# Patient Record
Sex: FEMALE | Race: WHITE
[De-identification: names, ages, dates, MRNs, and addresses within clinical notes are randomized per-mention and may not be internally consistent; named-entity substitution may affect disease eponyms.]

---

## 2018-01-26 NOTE — PD
HPI


Chief Complaint:  Musculoskeletal Complaint


Time Seen by Provider:  10:18


Travel History


International Travel<30 days:  No


Contact w/Intl Traveler<30days:  No


Traveled to known affect area:  No





History of Present Illness


HPI


This is a 67-year-old female here with right foot pain after she tripped on a 

[place] yesterday evening.  Did not fall to the ground.  No head injury or loss of 

consciousness.  No paresthesia or weakness of the 70.  She has pain with 

weightbearing on the foot which is relieved with rest.  She is also complaining 

of mild URI-like symptoms 3 days.  The symptoms include nasal congestion, sore 

throat, cough.  Denies fever, chills, myalgias.





PFSH


Past Medical History


Anemia:  Yes


Anxiety:  Yes


Depression:  Yes


Gastrointestinal Disorders:  Yes (BARIATRIC SURGERY 1998, "TUMMY TUCK")


Hypertension:  Yes


Medical other:  Yes (TMJ)


Tetanus Vaccination:  > 5 Years


Influenza Vaccination:  No


Pregnant?:  Not Pregnant


Menopausal:  Yes





Past Surgical History


Abdominal Surgery:  Yes (gastric bypass and tummy tuck)


Cholecystectomy:  Yes


Tonsillectomy:  Yes





Social History


Alcohol Use:  Yes (rarely)


Tobacco Use:  No


Substance Use:  No





Allergies-Medications


(Allergen,Severity, Reaction):  


Coded Allergies:  


     No Known Allergies (Unverified  Adverse Reaction, Unknown, 1/26/18)


Reported Meds & Prescriptions





Reported Meds & Active Scripts


Active


Reported


Carbamazepine 100 Mg Chew 100 Mg CHEW TID


Khedezla 24 HR (Desvenlafaxine Succinate) 50 Mg Tab 50 Mg PO DAILY








Review of Systems


Except as stated in HPI:  all other systems reviewed are Neg


General / Constitutional:  No: Fever





Physical Exam


Narrative


GENERAL: Alert and nontoxic appearing 67-year-old female


SKIN: Warm and dry. No rash


HEAD: Normocephalic.  Atraumatic


EYES: No injection or drainage.  Pupils equal, round, reactive.


EARS/NOSE/THROAT: No TM erythema. Clear nasal discharge. Mild pharyngeal 

erythema without tonsilar hypertrophy or exudate.


NECK: Supple, trachea midline. No meningismus


CARDIOVASCULAR: Regular rate and rhythm 


RESPIRATORY: Breath sounds equal bilaterally. No accessory muscle use. No 

wheezing, rales, rhonchi.


GASTROINTESTINAL: Abdomen soft, non-tender, nondistended. 


MUSCULOSKELETAL: No cyanosis.  Right foot: Tenderness over the distal aspect of 

the fifth metatarsal.  No deformity.  Minimal swelling.  Patient can flex and 

extend all toes.  2+ dorsal pedis pulse.  Brisk cap refill.





Data


Data


Last Documented VS





Vital Signs








  Date Time  Temp Pulse Resp B/P (MAP) Pulse Ox O2 Delivery O2 Flow Rate FiO2


 


1/26/18 10:14 98.7 67 16 210/87 (128) 97   








Orders





 Orders


Foot, Complete (Arv2sux) (1/26/18 )


Splint Or Brace Apply/Monitor (1/26/18 11:09)








MDM


Medical Decision Making


Medical Screen Exam Complete:  Yes


Emergency Medical Condition:  Yes


Differential Diagnosis


Metatarsal fracture, toe fracture, sprain, contusion, URI


Narrative Course


This is a 67-year-old female here with right foot pain after she twisted the 

foot yesterday evening.  The extremity is neurovascularly intact.  No 

deformity.  She also has mild URI-like illness.  I do not suspect influenza.  X-

ray show a proximal fifth phalanx fracture.  Patient was put in a postop shoe.  

Crutches were offered.  Pain medication.  Rest, ice, elevate the extremity.  

Instructed to follow-up with her podiatrist Dr. Brito.





Diagnosis





 Primary Impression:  


 Fracture of proximal phalanx of lesser toe of right foot


 Qualified Codes:  S92.514A - Nondisplaced fracture of proximal phalanx of 

right lesser toe(s), initial encounter for closed fracture


 Additional Impression:  


 URI (upper respiratory infection)


 Qualified Codes:  J06.9 - Acute upper respiratory infection, unspecified


Referrals:  


Vance Brito DPM





***Additional Instructions:  


Rest, ice, elevate the extremity.


Postop shoe as directed.


Crutches or cane for weightbearing as needed.


Make a follow-up appointment with Dr. Brito.


Scripts


Acetaminophen-Codeine (Tylenol-Codeine #3) 300-30 mg Tab


1-2 TAB PO Q6H Y for PAIN, #12 TAB 0 Refills


   Prov: Carmen Nair         1/26/18











Carmen Nair Jan 26, 2018 11:18

## 2018-01-26 NOTE — RADRPT
EXAM DATE/TIME:  01/26/2018 10:48 

 

CORRECTION

Corrected on: January 26, 2018; 

 

 

 

[facility] COMPARISON:     

No previous studies available for comparison.

 

                     

INDICATIONS :     

Tripped and fell, right lateral foot pain

                     

 

MEDICAL HISTORY :     

None.          

 

SURGICAL HISTORY :     

None.   

 

ENCOUNTER:     

Initial                                        

 

ACUITY:     

3 days      

 

PAIN SCORE:     

7/10

 

LOCATION:     

Right lateral foot

 

FINDINGS:     

There is 8 minimally displaced fracture proximal phalanx fifth toe.  No other fractures appreciated.

CONCLUSION:     Fracture proximal fifth toe.  

 

 

 Petr Hyman MD FACR on January 26, 2018 at 11:13           

Board Certified Radiologist.

 This report was verified electronically.  Petr Hyman MD FACR on January 26, 2018 at 11:30     
      

Board Certified Radiologist.

 This report was verified electronically.

## 2018-02-25 ENCOUNTER — HOSPITAL ENCOUNTER (EMERGENCY)
Dept: HOSPITAL 17 - PHED | Age: 68
Discharge: HOME | End: 2018-02-25
Payer: COMMERCIAL

## 2018-02-25 VITALS
SYSTOLIC BLOOD PRESSURE: 115 MMHG | OXYGEN SATURATION: 95 % | RESPIRATION RATE: 16 BRPM | HEART RATE: 80 BPM | DIASTOLIC BLOOD PRESSURE: 73 MMHG

## 2018-02-25 VITALS
TEMPERATURE: 97.9 F | SYSTOLIC BLOOD PRESSURE: 179 MMHG | WEIGHT: 277.78 LBS | BODY MASS INDEX: 47.42 KG/M2 | OXYGEN SATURATION: 97 % | RESPIRATION RATE: 24 BRPM | HEIGHT: 64 IN | DIASTOLIC BLOOD PRESSURE: 76 MMHG | HEART RATE: 129 BPM

## 2018-02-25 VITALS
SYSTOLIC BLOOD PRESSURE: 134 MMHG | RESPIRATION RATE: 16 BRPM | DIASTOLIC BLOOD PRESSURE: 81 MMHG | HEART RATE: 71 BPM | OXYGEN SATURATION: 95 %

## 2018-02-25 VITALS
SYSTOLIC BLOOD PRESSURE: 149 MMHG | OXYGEN SATURATION: 96 % | RESPIRATION RATE: 18 BRPM | DIASTOLIC BLOOD PRESSURE: 78 MMHG | HEART RATE: 88 BPM

## 2018-02-25 VITALS
DIASTOLIC BLOOD PRESSURE: 73 MMHG | OXYGEN SATURATION: 95 % | SYSTOLIC BLOOD PRESSURE: 115 MMHG | RESPIRATION RATE: 18 BRPM | TEMPERATURE: 97.9 F | HEART RATE: 91 BPM

## 2018-02-25 DIAGNOSIS — F32.9: ICD-10-CM

## 2018-02-25 DIAGNOSIS — I51.7: ICD-10-CM

## 2018-02-25 DIAGNOSIS — I48.91: Primary | ICD-10-CM

## 2018-02-25 DIAGNOSIS — D64.9: ICD-10-CM

## 2018-02-25 DIAGNOSIS — I10: ICD-10-CM

## 2018-02-25 DIAGNOSIS — R94.31: ICD-10-CM

## 2018-02-25 DIAGNOSIS — R06.02: ICD-10-CM

## 2018-02-25 DIAGNOSIS — Z79.899: ICD-10-CM

## 2018-02-25 DIAGNOSIS — F41.9: ICD-10-CM

## 2018-02-25 LAB
BASOPHILS # BLD AUTO: 0.1 TH/MM3 (ref 0–0.2)
BASOPHILS NFR BLD: 0.9 % (ref 0–2)
BUN SERPL-MCNC: 12 MG/DL (ref 7–18)
CALCIUM SERPL-MCNC: 8.5 MG/DL (ref 8.5–10.1)
CHLORIDE SERPL-SCNC: 106 MEQ/L (ref 98–107)
CREAT SERPL-MCNC: 0.68 MG/DL (ref 0.5–1)
EOSINOPHIL # BLD: 0.1 TH/MM3 (ref 0–0.4)
EOSINOPHIL NFR BLD: 1.3 % (ref 0–4)
ERYTHROCYTE [DISTWIDTH] IN BLOOD BY AUTOMATED COUNT: 18.8 % (ref 11.6–17.2)
GFR SERPLBLD BASED ON 1.73 SQ M-ARVRAT: 87 ML/MIN (ref 89–?)
GLUCOSE SERPL-MCNC: 93 MG/DL (ref 74–106)
HCO3 BLD-SCNC: 28.5 MEQ/L (ref 21–32)
HCT VFR BLD CALC: 33.9 % (ref 35–46)
HGB BLD-MCNC: 11.1 GM/DL (ref 11.6–15.3)
INR PPP: 1.1 RATIO
LYMPHOCYTES # BLD AUTO: 1.1 TH/MM3 (ref 1–4.8)
LYMPHOCYTES NFR BLD AUTO: 18.9 % (ref 9–44)
MAGNESIUM SERPL-MCNC: 2.1 MG/DL (ref 1.5–2.5)
MCH RBC QN AUTO: 27.6 PG (ref 27–34)
MCHC RBC AUTO-ENTMCNC: 32.8 % (ref 32–36)
MCV RBC AUTO: 84.3 FL (ref 80–100)
MONOCYTE #: 0.6 TH/MM3 (ref 0–0.9)
MONOCYTES NFR BLD: 9.9 % (ref 0–8)
NEUTROPHILS # BLD AUTO: 3.7 TH/MM3 (ref 1.8–7.7)
NEUTROPHILS NFR BLD AUTO: 69 % (ref 16–70)
PLATELET # BLD: 250 TH/MM3 (ref 150–450)
PMV BLD AUTO: 8.7 FL (ref 7–11)
PROTHROMBIN TIME: 10.7 SEC (ref 9.8–11.6)
RBC # BLD AUTO: 4.03 MIL/MM3 (ref 4–5.3)
SODIUM SERPL-SCNC: 142 MEQ/L (ref 136–145)
TROPONIN I SERPL-MCNC: (no result) NG/ML (ref 0.02–0.05)
WBC # BLD AUTO: 5.6 TH/MM3 (ref 4–11)

## 2018-02-25 PROCEDURE — 93005 ELECTROCARDIOGRAM TRACING: CPT

## 2018-02-25 PROCEDURE — 80048 BASIC METABOLIC PNL TOTAL CA: CPT

## 2018-02-25 PROCEDURE — 84484 ASSAY OF TROPONIN QUANT: CPT

## 2018-02-25 PROCEDURE — 85610 PROTHROMBIN TIME: CPT

## 2018-02-25 PROCEDURE — 96374 THER/PROPH/DIAG INJ IV PUSH: CPT

## 2018-02-25 PROCEDURE — 85025 COMPLETE CBC W/AUTO DIFF WBC: CPT

## 2018-02-25 PROCEDURE — 85730 THROMBOPLASTIN TIME PARTIAL: CPT

## 2018-02-25 PROCEDURE — 71045 X-RAY EXAM CHEST 1 VIEW: CPT

## 2018-02-25 PROCEDURE — 83735 ASSAY OF MAGNESIUM: CPT

## 2018-02-25 PROCEDURE — 84443 ASSAY THYROID STIM HORMONE: CPT

## 2018-02-25 NOTE — PD
HPI


.


Shortness of breath


Chief Complaint:  Shortness of breath


Time Seen by Provider:  16:43


Travel History


International Travel<30 days:  No


Contact w/Intl Traveler<30days:  No





History of Present Illness


HPI


This patient presents with a 3 week history of shortness of breath.  She 

especially has dyspnea on exertion.  She states that she is not short of breath 

at rest.  She had no idea that her heart was beating too fast.  However, she 

states that she seen as an outpatient for an iron infusion 3 weeks ago and was 

found to be tachycardic.  She was advised to come to the emergency department 

at that time for evaluation but did not do so.  She attributed her symptoms to 

anxiety.  She states that she presented to us today simply because her symptoms 

have persisted.  There was no no acute change that brought her in today.





PFSH


Past Medical History


Anemia:  Yes


Anxiety:  Yes


Depression:  Yes


Gastrointestinal Disorders:  Yes (BARIATRIC SURGERY 1998, "TUMMY TUCK")


Hypertension:  Yes


Menopausal:  Yes





Past Surgical History


Abdominal Surgery:  Yes (gastric bypass and tummy tuck)


Cholecystectomy:  Yes


Tonsillectomy:  Yes





Social History


Alcohol Use:  Yes (rarely)


Tobacco Use:  No


Substance Use:  No





Allergies-Medications


(Allergen,Severity, Reaction):  


Coded Allergies:  


     No Known Allergies (Unverified  Adverse Reaction, Unknown, 2/25/18)


Reported Meds & Prescriptions





Reported Meds & Active Scripts


Active


Xarelto (Rivaroxaban) 20 Mg Tab 20 Mg PO DAILY


Cardizem CD 24 HR (Diltiazem CD 24 HR) 180 Mg Caper 180 Mg PO DAILY


Reported


Carbamazepine 100 Mg Chew 100 Mg CHEW TID


Xanax (Alprazolam) 0.5 Mg Tab 0.5 Mg PO DAILY PRN


Khedezla 24 HR (Desvenlafaxine Succinate) 50 Mg Tab 50 Mg PO DAILY








Review of Systems


Except as stated in HPI:  all other systems reviewed are Neg


Respiratory:  Positive: Shortness of Breath





Physical Exam


Narrative


GENERAL: Awake and alert.


SKIN:  warm/dry.  Normal color and turgor.


HEAD: Normocephalic.  Atraumatic.


EYES: Pupils equal and round. No scleral icterus. No injection or drainage. 


ENT: No nasal bleeding or discharge.  Mucous membranes pink and moist.


NECK: Trachea midline.  Full range of motion without pain.. 


CARDIOVASCULAR: Irregular rhythm with a rate variable between about 110 and 150.


RESPIRATORY: No accessory muscle use. Clear to auscultation. Breath sounds 

equal bilaterally. 


GASTROINTESTINAL: Abdomen soft.  Nontender.  Bowel sounds present.  

Nondistended.


MUSCULOSKELETAL: No obvious deformities. 


NEUROLOGICAL: Awake and alert. No obvious cranial nerve deficits.  Motor 

grossly within normal limits. Normal speech.


PSYCHIATRIC: Appropriate mood and affect; insight and judgment normal.





Data


Data


Last Documented VS





Vital Signs








  Date Time  Temp Pulse Resp B/P (MAP) Pulse Ox O2 Delivery O2 Flow Rate FiO2


 


2/25/18 18:36  88 18 149/78 (101) 96 Room Air  


 


2/25/18 17:22       2.00 


 


2/25/18 17:06 97.9       








Orders





 Orders


Ecg Monitoring (2/25/18 16:43)


Blood Pressure (2/25/18 16:43)


Iv Access Insert/Monitor (2/25/18 16:43)


Oximetry (2/25/18 16:43)


Vital Signs (2/25/18 16:43)


Diltiazem Inj (Cardizem Inj) (2/25/18 16:45)


Sodium Chloride 0.9% Flush (Ns Flush) (2/25/18 16:45)


Basic Metabolic Panel (Bmp) (2/25/18 16:51)


Complete Blood Count With Diff (2/25/18 16:51)


Magnesium (Mg) (2/25/18 16:51)


Prothrombin Time / Inr (Pt) (2/25/18 16:51)


Act Partial Throm Time (Ptt) (2/25/18 16:51)


Troponin I (2/25/18 16:51)


Chest, Single Ap (2/25/18 16:51)


Aspirin Chew (Aspirin Chew) (2/25/18 17:00)


Sodium Chloride 0.9% Flush (Ns Flush) (2/25/18 17:00)


Thyroid Stimulating Hormone (2/25/18 16:51)


Rivaroxaban (Xarelto) (2/25/18 17:00)


Diltiazem Cd (Cardizem Cd) (2/25/18 17:00)


Rivaroxaban (Xarelto) (2/25/18 17:15)





Labs





Laboratory Tests








Test


  2/25/18


17:00


 


White Blood Count 5.6 TH/MM3 


 


Red Blood Count 4.03 MIL/MM3 


 


Hemoglobin 11.1 GM/DL 


 


Hematocrit 33.9 % 


 


Mean Corpuscular Volume 84.3 FL 


 


Mean Corpuscular Hemoglobin 27.6 PG 


 


Mean Corpuscular Hemoglobin


Concent 32.8 % 


 


 


Red Cell Distribution Width 18.8 % 


 


Platelet Count 250 TH/MM3 


 


Mean Platelet Volume 8.7 FL 


 


Neutrophils (%) (Auto) 69.0 % 


 


Lymphocytes (%) (Auto) 18.9 % 


 


Monocytes (%) (Auto) 9.9 % 


 


Eosinophils (%) (Auto) 1.3 % 


 


Basophils (%) (Auto) 0.9 % 


 


Neutrophils # (Auto) 3.7 TH/MM3 


 


Lymphocytes # (Auto) 1.1 TH/MM3 


 


Monocytes # (Auto) 0.6 TH/MM3 


 


Eosinophils # (Auto) 0.1 TH/MM3 


 


Basophils # (Auto) 0.1 TH/MM3 


 


CBC Comment DIFF FINAL 


 


Differential Comment  


 


Prothrombin Time 10.7 SEC 


 


Prothromb Time International


Ratio 1.1 RATIO 


 


 


Activated Partial


Thromboplast Time 26.0 SEC 


 


 


Blood Urea Nitrogen 12 MG/DL 


 


Creatinine 0.68 MG/DL 


 


Random Glucose 93 MG/DL 


 


Calcium Level 8.5 MG/DL 


 


Magnesium Level 2.1 MG/DL 


 


Sodium Level 142 MEQ/L 


 


Potassium Level 4.1 MEQ/L 


 


Chloride Level 106 MEQ/L 


 


Carbon Dioxide Level 28.5 MEQ/L 


 


Anion Gap 8 MEQ/L 


 


Estimat Glomerular Filtration


Rate 87 ML/MIN 


 


 


Troponin I


  LESS THAN 0.02


NG/ML


 


Thyroid Stimulating Hormone


3rd Gen 2.200 uIU/ML 


 











MDM


Medical Decision Making


Medical Screen Exam Complete:  Yes


Emergency Medical Condition:  Yes


Interpretation(s)


EKG shows atrial fibrillation.  No acute ischemic changes.


Differential Diagnosis


Differential diagnosis of dyspnea includes but is not limited to congestive 

heart failure, pneumonia, wheezing, pneumothorax, pulmonary embolism


Narrative Course


This patient presents with the complaint of dyspnea, especially dyspnea on 

exertion.  This has been ongoing for at least 3 weeks.  On arrival here, she 

was found to be in atrial fibrillation with a rapid ventricular response.  She 

had no idea that she had an irregular heart rhythm.  She denies any previous 

similar history.





An IV was established and she was given a Cardizem bolus.  Her heart rate has 

quickly come down to about 80 and she states that she feels much better.





I have ordered an oral dose of Cardizem along with a dose of Xarelto.  Chest x-

ray and lab work are in process.





CBC & BMP Diagram


2/25/18 17:00








Calcium Level 8.5, Magnesium Level 2.1





trop < 0.02





TSH 2.2





CXR:


1. There is an interstitial changes bilaterally which could be chronic versus 

edema.


2. Moderate cardiomegaly.


3. No focal parenchymal infiltrates





The chest x-ray was independently viewed by me.





The patient has been ambulated about the department.  She no longer has dyspnea 

on exertion.  She will be discharged to home with prescriptions for Cardizem 

and Xarelto and instructions to keep her appointment with her primary care 

provider this week.





Diagnosis





 Primary Impression:  


 Atrial fibrillation with rapid ventricular response


Referrals:  


Ekaterina Lopez MD


3 days


Patient Instructions:  A-fib (Atrial Fibrillation) (DC), General Instructions


***Med/Other Pt SpecificInfo:  Prescription(s) given


Scripts


Rivaroxaban (Xarelto) 20 Mg Tab


20 MG PO DAILY for Blood Clot Prevention, #30 TAB 0 Refills


   Prov: Justyna Zambrano MD         2/25/18 


Diltiazem CD 24 HR (Cardizem CD 24 HR) 180 Mg Caper


180 MG PO DAILY, #30 CAP 0 Refills


   Prov: Justyna Zambrano MD         2/25/18


Disposition:  01 DISCHARGE HOME


Condition:  Stable











Justyna Zambrano MD Feb 25, 2018 17:06

## 2018-02-25 NOTE — RADRPT
EXAM DATE/TIME:  02/25/2018 17:44 

 

[facility] COMPARISON:     

No previous studies available for comparison.

 

                     

INDICATIONS :     

Shortness of breath.

                     

 

MEDICAL HISTORY :     

None.          

 

SURGICAL HISTORY :     

None.   

 

ENCOUNTER:     

Initial                                        

 

ACUITY:     

2 days      

 

PAIN SCORE:     

0/10

 

LOCATION:     

Bilateral chest 

 

FINDINGS:     

A single view of the chest demonstrates the lungs to be symmetrically aerated without evidence of mas
s, infiltrate or effusion. There is increased changes are seen bilaterally. The heart size is enlarge
d..  Osseous structures are intact.

 

CONCLUSION:     

1. There is an interstitial changes bilaterally which could be chronic versus edema.

2. Moderate cardiomegaly.

3. No focal parenchymal infiltrates.

 

 

 

 Magdiel Bell MD on February 25, 2018 at 18:08           

Board Certified Radiologist.

 This report was verified electronically.

## 2018-02-26 NOTE — EKG
Date Performed: 02/25/2018       Time Performed: 16:41:37

 

PTAGE:      68 years

 

EKG:      ATRIAL FIBRILLATION WITH RAPID VENTRICULAR RESPONSE BORDERLINE LEFT AXIS DEVIATION NONSPECI
FIC T-WAVE ABNORMALITY Compared to previous tracing, atrial fibrillation is new. Clinical correlation
 is recommended ABNORMAL RHYTHM ECG

 

PREVIOUS TRACING       : 07/03/2013 21.49

 

DOCTOR:   Shashank Ram  Interpretating Date/Time  02/26/2018 15:26:48

## 2018-02-28 ENCOUNTER — HOSPITAL ENCOUNTER (INPATIENT)
Dept: HOSPITAL 17 - PHED | Age: 68
LOS: 1 days | Discharge: HOME | DRG: 308 | End: 2018-03-01
Attending: HOSPITALIST | Admitting: HOSPITALIST
Payer: COMMERCIAL

## 2018-02-28 VITALS
DIASTOLIC BLOOD PRESSURE: 99 MMHG | OXYGEN SATURATION: 98 % | HEART RATE: 98 BPM | RESPIRATION RATE: 18 BRPM | SYSTOLIC BLOOD PRESSURE: 151 MMHG

## 2018-02-28 VITALS
HEART RATE: 89 BPM | SYSTOLIC BLOOD PRESSURE: 111 MMHG | OXYGEN SATURATION: 98 % | DIASTOLIC BLOOD PRESSURE: 71 MMHG | RESPIRATION RATE: 18 BRPM

## 2018-02-28 VITALS
SYSTOLIC BLOOD PRESSURE: 154 MMHG | RESPIRATION RATE: 20 BRPM | HEART RATE: 130 BPM | DIASTOLIC BLOOD PRESSURE: 85 MMHG | TEMPERATURE: 97 F

## 2018-02-28 VITALS — DIASTOLIC BLOOD PRESSURE: 89 MMHG | SYSTOLIC BLOOD PRESSURE: 138 MMHG

## 2018-02-28 VITALS — WEIGHT: 277.12 LBS | HEIGHT: 64 IN | BODY MASS INDEX: 47.31 KG/M2

## 2018-02-28 VITALS
OXYGEN SATURATION: 100 % | HEART RATE: 86 BPM | RESPIRATION RATE: 16 BRPM | SYSTOLIC BLOOD PRESSURE: 116 MMHG | DIASTOLIC BLOOD PRESSURE: 89 MMHG | TEMPERATURE: 96.8 F

## 2018-02-28 VITALS
SYSTOLIC BLOOD PRESSURE: 121 MMHG | OXYGEN SATURATION: 99 % | RESPIRATION RATE: 16 BRPM | TEMPERATURE: 96.6 F | HEART RATE: 119 BPM | DIASTOLIC BLOOD PRESSURE: 84 MMHG

## 2018-02-28 VITALS — OXYGEN SATURATION: 97 % | RESPIRATION RATE: 20 BRPM | HEART RATE: 88 BPM | TEMPERATURE: 98.5 F

## 2018-02-28 VITALS
RESPIRATION RATE: 18 BRPM | OXYGEN SATURATION: 97 % | DIASTOLIC BLOOD PRESSURE: 97 MMHG | SYSTOLIC BLOOD PRESSURE: 138 MMHG | HEART RATE: 100 BPM

## 2018-02-28 VITALS — HEART RATE: 81 BPM

## 2018-02-28 DIAGNOSIS — I11.0: ICD-10-CM

## 2018-02-28 DIAGNOSIS — Z98.84: ICD-10-CM

## 2018-02-28 DIAGNOSIS — I50.21: ICD-10-CM

## 2018-02-28 DIAGNOSIS — F32.9: ICD-10-CM

## 2018-02-28 DIAGNOSIS — F41.9: ICD-10-CM

## 2018-02-28 DIAGNOSIS — R00.0: ICD-10-CM

## 2018-02-28 DIAGNOSIS — I48.91: Primary | ICD-10-CM

## 2018-02-28 LAB
ALBUMIN SERPL-MCNC: 3.5 GM/DL (ref 3.4–5)
ALP SERPL-CCNC: 149 U/L (ref 45–117)
ALT SERPL-CCNC: 34 U/L (ref 10–53)
AST SERPL-CCNC: 34 U/L (ref 15–37)
BASOPHILS # BLD AUTO: 0 TH/MM3 (ref 0–0.2)
BASOPHILS NFR BLD: 0.7 % (ref 0–2)
BILIRUB SERPL-MCNC: 0.5 MG/DL (ref 0.2–1)
BUN SERPL-MCNC: 12 MG/DL (ref 7–18)
CALCIUM SERPL-MCNC: 8.5 MG/DL (ref 8.5–10.1)
CHLORIDE SERPL-SCNC: 105 MEQ/L (ref 98–107)
CREAT SERPL-MCNC: 0.74 MG/DL (ref 0.5–1)
EOSINOPHIL # BLD: 0 TH/MM3 (ref 0–0.4)
EOSINOPHIL NFR BLD: 1 % (ref 0–4)
ERYTHROCYTE [DISTWIDTH] IN BLOOD BY AUTOMATED COUNT: 18.1 % (ref 11.6–17.2)
GFR SERPLBLD BASED ON 1.73 SQ M-ARVRAT: 79 ML/MIN (ref 89–?)
GLUCOSE SERPL-MCNC: 85 MG/DL (ref 74–106)
HCO3 BLD-SCNC: 29.3 MEQ/L (ref 21–32)
HCT VFR BLD CALC: 35.5 % (ref 35–46)
HGB BLD-MCNC: 11 GM/DL (ref 11.6–15.3)
INR PPP: 1.1 RATIO
LYMPHOCYTES # BLD AUTO: 0.6 TH/MM3 (ref 1–4.8)
LYMPHOCYTES NFR BLD AUTO: 13.5 % (ref 9–44)
MAGNESIUM SERPL-MCNC: 2 MG/DL (ref 1.5–2.5)
MCH RBC QN AUTO: 25.8 PG (ref 27–34)
MCHC RBC AUTO-ENTMCNC: 30.9 % (ref 32–36)
MCV RBC AUTO: 83.5 FL (ref 80–100)
MONOCYTE #: 0.3 TH/MM3 (ref 0–0.9)
MONOCYTES NFR BLD: 7.9 % (ref 0–8)
NEUTROPHILS # BLD AUTO: 3.5 TH/MM3 (ref 1.8–7.7)
NEUTROPHILS NFR BLD AUTO: 76.9 % (ref 16–70)
PLATELET # BLD: 261 TH/MM3 (ref 150–450)
PMV BLD AUTO: 8.2 FL (ref 7–11)
PROT SERPL-MCNC: 7.1 GM/DL (ref 6.4–8.2)
PROTHROMBIN TIME: 11.1 SEC (ref 9.8–11.6)
RBC # BLD AUTO: 4.25 MIL/MM3 (ref 4–5.3)
SODIUM SERPL-SCNC: 139 MEQ/L (ref 136–145)
TROPONIN I SERPL-MCNC: (no result) NG/ML (ref 0.02–0.05)
WBC # BLD AUTO: 4.4 TH/MM3 (ref 4–11)

## 2018-02-28 PROCEDURE — 83880 ASSAY OF NATRIURETIC PEPTIDE: CPT

## 2018-02-28 PROCEDURE — 85610 PROTHROMBIN TIME: CPT

## 2018-02-28 PROCEDURE — 71046 X-RAY EXAM CHEST 2 VIEWS: CPT

## 2018-02-28 PROCEDURE — 84484 ASSAY OF TROPONIN QUANT: CPT

## 2018-02-28 PROCEDURE — 93306 TTE W/DOPPLER COMPLETE: CPT

## 2018-02-28 PROCEDURE — 85730 THROMBOPLASTIN TIME PARTIAL: CPT

## 2018-02-28 PROCEDURE — 80048 BASIC METABOLIC PNL TOTAL CA: CPT

## 2018-02-28 PROCEDURE — 82550 ASSAY OF CK (CPK): CPT

## 2018-02-28 PROCEDURE — 80053 COMPREHEN METABOLIC PANEL: CPT

## 2018-02-28 PROCEDURE — 83735 ASSAY OF MAGNESIUM: CPT

## 2018-02-28 PROCEDURE — 85025 COMPLETE CBC W/AUTO DIFF WBC: CPT

## 2018-02-28 PROCEDURE — 93005 ELECTROCARDIOGRAM TRACING: CPT

## 2018-02-28 PROCEDURE — 96374 THER/PROPH/DIAG INJ IV PUSH: CPT

## 2018-02-28 RX ADMIN — FUROSEMIDE SCH MG: 10 INJECTION, SOLUTION INTRAMUSCULAR; INTRAVENOUS at 18:46

## 2018-02-28 RX ADMIN — METOPROLOL TARTRATE SCH MG: 25 TABLET, FILM COATED ORAL at 17:39

## 2018-02-28 RX ADMIN — METOPROLOL TARTRATE SCH MG: 25 TABLET, FILM COATED ORAL at 21:31

## 2018-02-28 NOTE — RADRPT
EXAM DATE/TIME:  02/28/2018 11:29 

 

[facility] COMPARISON:     

No previous studies available for comparison.

 

                     

INDICATIONS :     

Chest pressure. Short of breath x few weeks. Lower extremity edema.

                     

 

MEDICAL HISTORY :            

Heart murmur. A-fib.   

 

SURGICAL HISTORY :     

Tonsillectomy. Cholecystectomy. Gastric bypass. 

 

ENCOUNTER:     

Initial                                        

 

ACUITY:     

1 day      

 

PAIN SCORE:     

0/10

 

LOCATION:      

chest 

 

FINDINGS:     

Cardiomegaly with mild interstitial edema consistent with congestive failure.  No pleural effusion.  
No inflammatory infiltrate. The portion of the bony skeleton visualized is unremarkable.

 

 

CONCLUSION:     

Cardiomegaly with mild interstitial edema consistent with failure.

 

 

 

 Petr Hyman MD FACR on February 28, 2018 at 11:39           

Board Certified Radiologist.

 This report was verified electronically.

## 2018-02-28 NOTE — PD
HPI


Chief Complaint:  Cardiac Complaint


Time Seen by Provider:  11:07


Travel History


International Travel<30 days:  No


Contact w/Intl Traveler<30days:  No


Traveled to known affect area:  No





History of Present Illness


HPI


This is a 68-year-old female who presents for dyspnea and atrial fibrillation.  

On February 25, the patient was seen in the emergency department and diagnosed 

with atrial fibrillation.  She received a single dose of IV Cardizem and had 

improvement in her heart rate.  She was discharged on Cardizem and Xarelto.  

Patient states she has been compliant with these medications.  She states that 

she does still have some dyspnea with exertion.  No dyspnea at rest.  She does 

have mild chest tightness if she becomes emotionally stressed.  She is not 

otherwise having chest pain.  No diaphoresis.  She has had increase in 

bilateral lower extremity edema.  She has gained 6 pounds in a week.  She saw 

her primary physician today and was referred to the emergency department.  

Symptoms are moderate in severity.  Onset gradual.  Partially alleviated by 

Cardizem.  She denies associated fever, chills, cough, congestion.





PFSH


Past Medical History


Anemia:  Yes


Atrial Fibrillation:  Yes


Anxiety:  Yes


Depression:  Yes


Cardiovascular Problems:  Yes (heart murmur)


Diminished Hearing:  No


Gastrointestinal Disorders:  Yes (BARIATRIC SURGERY 1998, "TUMMY TUCK")


Hypertension:  Yes


Immunizations Current:  Yes


Menopausal:  Yes





Past Surgical History


Abdominal Surgery:  Yes (gastric bypass and tummy tuck)


Cholecystectomy:  Yes


Tonsillectomy:  Yes





Social History


Alcohol Use:  Yes (rarely)


Tobacco Use:  No


Substance Use:  No





Allergies-Medications


(Allergen,Severity, Reaction):  


Coded Allergies:  


     No Known Allergies (Unverified  Adverse Reaction, Unknown, 2/28/18)


Reported Meds & Prescriptions





Reported Meds & Active Scripts


Active


Xarelto (Rivaroxaban) 20 Mg Tab 20 Mg PO DAILY


Cardizem CD 24 HR (Diltiazem CD 24 HR) 180 Mg Caper 180 Mg PO DAILY


Reported


Carbamazepine 100 Mg Chew 100 Mg CHEW TID


Xanax (Alprazolam) 0.5 Mg Tab 0.5 Mg PO DAILY PRN


Khedezla 24 HR (Desvenlafaxine Succinate) 50 Mg Tab 50 Mg PO DAILY








Review of Systems


Except as stated in HPI:  all other systems reviewed are Neg





Physical Exam


Narrative


GENERAL: Alert, well nourished, well appearing patient resting on the bed in no 

acute distress.  Vital Signs reviewed


SKIN: Focused skin assessment warm/dry.


HEAD: Atraumatic. Normocephalic. 


EYES: Pupils equal and round. No scleral icterus. No injection or drainage. 


ENT: No nasal bleeding or discharge.  Mucous membranes pink and moist.


NECK: Trachea midline. No JVD. Spontaneous, painless full range of motion with 

no meningismus


CARDIOVASCULAR: Tachycardic with an irregularly irregular rhythm.  No murmur 

appreciated.  Extremities warm and well perfused with bounding peripheral 

pulses.  Symmetric bilateral lower extremity edema to mid shin.


RESPIRATORY: No accessory muscle use. Clear to auscultation. Breath sounds 

equal bilaterally. Breathing easily and speaking in full sentences


GASTROINTESTINAL: Abdomen soft, non-tender, nondistended. Normal bowel sounds.  

No rigid, rebound, guarding


MUSCULOSKELETAL: No obvious deformities. No clubbing.  No cyanosis. 

Compartments are soft


NEUROLOGICAL: Awake and alert. No obvious cranial nerve deficits.  Motor 

grossly within normal limits. Normal speech.  Sensation intact. Normal gait





Data


Data


Last Documented VS





Vital Signs








  Date Time  Temp Pulse Resp B/P (MAP) Pulse Ox O2 Delivery O2 Flow Rate FiO2


 


2/28/18 11:54  81      


 


2/28/18 11:53   18  97 Room Air  


 


2/28/18 11:15 97.0       








Orders





 Orders


Electrocardiogram (2/28/18 11:15)


B-Type Natriuretic Peptide (2/28/18 11:15)


Ckmb (Isoenzyme) Profile (2/28/18 11:15)


Complete Blood Count With Diff (2/28/18 11:15)


Comprehensive Metabolic Panel (2/28/18 11:15)


Magnesium (Mg) (2/28/18 11:15)


Prothrombin Time / Inr (Pt) (2/28/18 11:15)


Act Partial Throm Time (Ptt) (2/28/18 11:15)


Troponin I (2/28/18 11:15)


Ecg Monitoring (2/28/18 11:15)


Bilateral Bp Monitoring (2/28/18 11:15)


Iv Access Insert/Monitor (2/28/18 11:15)


Oximetry (2/28/18 11:15)


Sodium Chloride 0.9% Flush (Ns Flush) (2/28/18 11:15)


Chest, Pa & Lat (2/28/18 11:15)


Diltiazem Inj (Cardizem Inj) (2/28/18 11:15)


Furosemide Inj (Lasix Inj) (2/28/18 12:15)





Labs





Laboratory Tests








Test


  2/28/18


11:25


 


White Blood Count 4.4 TH/MM3 


 


Red Blood Count 4.25 MIL/MM3 


 


Hemoglobin 11.0 GM/DL 


 


Hematocrit 35.5 % 


 


Mean Corpuscular Volume 83.5 FL 


 


Mean Corpuscular Hemoglobin 25.8 PG 


 


Mean Corpuscular Hemoglobin


Concent 30.9 % 


 


 


Red Cell Distribution Width 18.1 % 


 


Platelet Count 261 TH/MM3 


 


Mean Platelet Volume 8.2 FL 


 


Neutrophils (%) (Auto) 76.9 % 


 


Lymphocytes (%) (Auto) 13.5 % 


 


Monocytes (%) (Auto) 7.9 % 


 


Eosinophils (%) (Auto) 1.0 % 


 


Basophils (%) (Auto) 0.7 % 


 


Neutrophils # (Auto) 3.5 TH/MM3 


 


Lymphocytes # (Auto) 0.6 TH/MM3 


 


Monocytes # (Auto) 0.3 TH/MM3 


 


Eosinophils # (Auto) 0.0 TH/MM3 


 


Basophils # (Auto) 0.0 TH/MM3 


 


CBC Comment DIFF FINAL 


 


Differential Comment  


 


Prothrombin Time 11.1 SEC 


 


Prothromb Time International


Ratio 1.1 RATIO 


 


 


Activated Partial


Thromboplast Time 28.0 SEC 


 


 


Blood Urea Nitrogen 12 MG/DL 


 


Creatinine 0.74 MG/DL 


 


Random Glucose 85 MG/DL 


 


Total Protein 7.1 GM/DL 


 


Albumin 3.5 GM/DL 


 


Calcium Level 8.5 MG/DL 


 


Magnesium Level 2.0 MG/DL 


 


Alkaline Phosphatase 149 U/L 


 


Aspartate Amino Transf


(AST/SGOT) 34 U/L 


 


 


Alanine Aminotransferase


(ALT/SGPT) 34 U/L 


 


 


Total Bilirubin 0.5 MG/DL 


 


Sodium Level 139 MEQ/L 


 


Potassium Level 4.5 MEQ/L 


 


Chloride Level 105 MEQ/L 


 


Carbon Dioxide Level 29.3 MEQ/L 


 


Anion Gap 5 MEQ/L 


 


Estimat Glomerular Filtration


Rate 79 ML/MIN 


 


 


Total Creatine Kinase 63 U/L 


 


Troponin I


  LESS THAN 0.02


NG/ML


 


B-Type Natriuretic Peptide 291 PG/ML 











MDM


Medical Decision Making


Medical Screen Exam Complete:  Yes


Emergency Medical Condition:  Yes


Medical Record Reviewed:  Yes


Interpretation(s)


EKG shows atrial fibrillation with rapid ventricular response.  Rate 119.  No 

STEMI





Laboratory Tests








Test


  2/28/18


11:25


 


White Blood Count 4.4 TH/MM3 


 


Red Blood Count 4.25 MIL/MM3 


 


Hemoglobin 11.0 GM/DL 


 


Hematocrit 35.5 % 


 


Mean Corpuscular Volume 83.5 FL 


 


Mean Corpuscular Hemoglobin 25.8 PG 


 


Mean Corpuscular Hemoglobin


Concent 30.9 % 


 


 


Red Cell Distribution Width 18.1 % 


 


Platelet Count 261 TH/MM3 


 


Mean Platelet Volume 8.2 FL 


 


Neutrophils (%) (Auto) 76.9 % 


 


Lymphocytes (%) (Auto) 13.5 % 


 


Monocytes (%) (Auto) 7.9 % 


 


Eosinophils (%) (Auto) 1.0 % 


 


Basophils (%) (Auto) 0.7 % 


 


Neutrophils # (Auto) 3.5 TH/MM3 


 


Lymphocytes # (Auto) 0.6 TH/MM3 


 


Monocytes # (Auto) 0.3 TH/MM3 


 


Eosinophils # (Auto) 0.0 TH/MM3 


 


Basophils # (Auto) 0.0 TH/MM3 


 


CBC Comment DIFF FINAL 


 


Differential Comment  


 


Prothrombin Time 11.1 SEC 


 


Prothromb Time International


Ratio 1.1 RATIO 


 


 


Activated Partial


Thromboplast Time 28.0 SEC 


 


 


Blood Urea Nitrogen 12 MG/DL 


 


Creatinine 0.74 MG/DL 


 


Random Glucose 85 MG/DL 


 


Total Protein 7.1 GM/DL 


 


Albumin 3.5 GM/DL 


 


Calcium Level 8.5 MG/DL 


 


Magnesium Level 2.0 MG/DL 


 


Alkaline Phosphatase 149 U/L 


 


Aspartate Amino Transf


(AST/SGOT) 34 U/L 


 


 


Alanine Aminotransferase


(ALT/SGPT) 34 U/L 


 


 


Total Bilirubin 0.5 MG/DL 


 


Sodium Level 139 MEQ/L 


 


Potassium Level 4.5 MEQ/L 


 


Chloride Level 105 MEQ/L 


 


Carbon Dioxide Level 29.3 MEQ/L 


 


Anion Gap 5 MEQ/L 


 


Estimat Glomerular Filtration


Rate 79 ML/MIN 


 


 


Total Creatine Kinase 63 U/L 


 


Troponin I


  LESS THAN 0.02


NG/ML


 


B-Type Natriuretic Peptide 291 PG/ML 








Last 24 hours Impressions








Chest X-Ray 2/28/18 1115 Signed





Impressions: 





 Service Date/Time:  Wednesday, February 28, 2018 11:29 - CONCLUSION:  





 Cardiomegaly with mild interstitial edema consistent with failure.     Petr Hyman MD  FACR








Differential Diagnosis


Atrial fibrillation, electrolyte abnormality, CHF, fluid overload, unstable 

angina


Narrative Course


The patient was placed on the cardiac monitor.  IV access was established.  EKG

, labs, imaging were performed.  Patient was given IV Cardizem with improvement 

in heart rate.  Her workup reveals atrial fibrillation and fluid overload.  

Plan for admission for further evaluation and care.  I reviewed the results of 

the workup with her.





Diagnosis





 Primary Impression:  


 Atrial fibrillation with rapid ventricular response


 Additional Impression:  


 CHF (congestive heart failure)


 Qualified Codes:  I50.9 - Heart failure, unspecified











Radha Linton MD Feb 28, 2018 11:23

## 2018-02-28 NOTE — HHI.HP
__________________________________________________





HPI


Service


[facility]


Primary Care Physician


LILY Madsen Do, MD


Admission Diagnosis





Atrial fibrillation with rapid ventricular response; Acute CHF


Diagnoses:  


Chief Complaint:  


swelling


Travel History


International Travel<30 Days:  No


Contact w/Intl Traveler <30 Da:  No


Traveled to Known Affected Are:  No


History of Present Illness


68-year-old white female being admitted for suspected acute systolic congestive 

heart failure.





Patient was in her usual state of health until the last 2 weeks or so when she 

noted that her lower extremity edema became more prominent and more constant.  

She recently came to the emergency department was diagnosed with A. fib and 

discharged on Cardizem and Xarelto.  She went to see her cardiologist today but 

instead saw her cardiologist nurse who noted that her heart rate was 

fluctuating up and down beyond 100.  She was referred to the emergency 

department.  In the ER she was noted to be tachycardic Pass 120.  EKG which 

independently reviewed shows tachycardia which could either be sinus rhythm 

given that there are evident P waves versus atrial fibrillation.  They 

administered IV Cardizem which slowed down the patient's heart rate 

substantially.





Review of Systems


Except as stated in HPI:  all other systems reviewed are Neg





Past Family Social History


Past Medical History


Anemia, A. fib


Allergies:  


Coded Allergies:  


     No Known Allergies (Unverified  Adverse Reaction, Unknown, 18)


Family History


CAD


Social History


Denies ever smoking


Light social drinking in the past





Physical Exam


Vital Signs





Vital Signs








  Date Time  Temp Pulse Resp B/P (MAP) Pulse Ox O2 Delivery O2 Flow Rate FiO2


 


18 14:22 96.6 119 16 121/84 (96) 99   


 


18 14:10  92 18 138/89 (105)    


 


18 12:56  98 18 151/99 (116) 98 Room Air  


 


18 12:40  89 18 111/71 (84) 98 Room Air  


 


18 11:54  81      


 


18 11:53  100 18 126/97 (107) 97 Room Air  





    138/80 (99)    


 


18 11:15  130 20 154/85 (108)    


 


18 11:15 97.0     Room Air  


 


18 11:15     97   


 


18 11:03 98.5 88 20  97   








Physical Exam


VS: afebrile


GENERAL: Standing up looking at the window, no acute distress


SKIN: Warm and dry.


EYES:  No scleral icterus. No injection or drainage. 


ENT: No nasal bleeding or discharge.  Mucous membranes pink and moist.


CARDIOVASCULAR: regular rate and irregular rhythm.  no murmurs


RESPIRATORY: No accessory muscle use. Clear to auscultation. Breath sounds 

equal bilaterally. 


GASTROINTESTINAL: Abdomen soft, non-tender, nondistended.


Extremities: No clubbing, cyanosis. moderate +2 pitting edema BL LEs


MUSCULOSKELETAL: adequate muscle bulk and tone for age and habitus


NEUROLOGICAL: Awake and alert. No obvious cranial nerve deficits.  No facial 

droop nor slurred speech noted.


PSYCHIATRIC: Appropriate mood and affect; insight and judgment normal.


Laboratory





Laboratory Tests








Test


  18


11:25


 


White Blood Count 4.4 


 


Red Blood Count 4.25 


 


Hemoglobin 11.0 


 


Hematocrit 35.5 


 


Mean Corpuscular Volume 83.5 


 


Mean Corpuscular Hemoglobin 25.8 


 


Mean Corpuscular Hemoglobin


Concent 30.9 


 


 


Red Cell Distribution Width 18.1 


 


Platelet Count 261 


 


Mean Platelet Volume 8.2 


 


Neutrophils (%) (Auto) 76.9 


 


Lymphocytes (%) (Auto) 13.5 


 


Monocytes (%) (Auto) 7.9 


 


Eosinophils (%) (Auto) 1.0 


 


Basophils (%) (Auto) 0.7 


 


Neutrophils # (Auto) 3.5 


 


Lymphocytes # (Auto) 0.6 


 


Monocytes # (Auto) 0.3 


 


Eosinophils # (Auto) 0.0 


 


Basophils # (Auto) 0.0 


 


CBC Comment DIFF FINAL 


 


Differential Comment  


 


Prothrombin Time 11.1 


 


Prothromb Time International


Ratio 1.1 


 


 


Activated Partial


Thromboplast Time 28.0 


 


 


Blood Urea Nitrogen 12 


 


Creatinine 0.74 


 


Random Glucose 85 


 


Total Protein 7.1 


 


Albumin 3.5 


 


Calcium Level 8.5 


 


Magnesium Level 2.0 


 


Alkaline Phosphatase 149 


 


Aspartate Amino Transf


(AST/SGOT) 34 


 


 


Alanine Aminotransferase


(ALT/SGPT) 34 


 


 


Total Bilirubin 0.5 


 


Sodium Level 139 


 


Potassium Level 4.5 


 


Chloride Level 105 


 


Carbon Dioxide Level 29.3 


 


Anion Gap 5 


 


Estimat Glomerular Filtration


Rate 79 


 


 


Total Creatine Kinase 63 


 


Troponin I LESS THAN 0.02 


 


B-Type Natriuretic Peptide 291 








Result Diagram:  


18 1125                                                                   

             18 1125





Imaging





Last Impressions








Chest X-Ray 18 1115 Signed





Impressions: 





 Service Date/Time:  2018 11:29 - CONCLUSION:  





 Cardiomegaly with mild interstitial edema consistent with failure.     Petr Hyman MD  FACR











Capwilmer VTE Risk Assessment


Caprini VTE Risk Assessment:  Mod/High Risk (score >= 2)


Caprini Risk Assessment Model











 Point Value = 1          Point Value = 2  Point Value = 3  Point Value = 5


 


Age 41-60


Minor surgery


BMI > 25 kg/m2


Swollen legs


Varicose veins


Pregnancy or postpartum


History of unexplained or recurrent


   spontaneous 


Oral contraceptives or hormone


   replacement


Sepsis (< 1 month)


Serious lung disease, including


   pneumonia (< 1 month)


Abnormal pulmonary function


Acute myocardial infarction


Congestive heart failure (< 1 month)


History of inflammatory bowel disease


Medical patient at bed rest Age 61-74


Arthroscopic surgery


Major open surgery (> 45 min)


Laparoscopic surgery (> 45 min)


Malignancy


Confined to bed (> 72 hours)


Immobilizing plaster cast


Central venous access Age >= 75


History of VTE


Family history of VTE


Factor V Leiden


Prothrombin 36775E


Lupus anticoagulant


Anticardiolipin antibodies


Elevated serum homocysteine


Heparin-induced thrombocytopenia


Other congenital or acquired


   thrombophilia Stroke (< 1 month)


Elective arthroplasty


Hip, pelvis, or leg fracture


Acute spinal cord injury (< 1 month)








Prophylaxis Regimen











   Total Risk


Factor Score Risk Level Prophylaxis Regimen


 


0-1      Low Early ambulation


 


2 Moderate Order ONE of the following:


*Sequential Compression Device (SCD)


*Heparin 5000 units SQ BID


 


3-4 Higher Order ONE of the following medications:


*Heparin 5000 units SQ TID


*Enoxaparin/Lovenox 40 mg SQ daily (WT < 150 kg, CrCl > 30 mL/min)


*Enoxaparin/Lovenox 30 mg SQ daily (WT < 150 kg, CrCl > 10-29 mL/min)


*Enoxaparin/Lovenox 30 mg SQ BID (WT < 150 kg, CrCl > 30 mL/min)


AND/OR


*Sequential Compression Device (SCD)


 


5 or more Highest Order ONE of the following medications:


*Heparin 5000 units SQ TID (Preferred with Epidurals)


*Enoxaparin/Lovenox 40 mg SQ daily (WT < 150 kg, CrCl > 30 mL/min)


*Enoxaparin/Lovenox 30 mg SQ daily (WT < 150 kg, CrCl > 10-29 mL/min)


*Enoxaparin/Lovenox 30 mg SQ BID (WT < 150 kg, CrCl > 30 mL/min)


AND


*Sequential Compression Device (SCD)











Assessment and Plan


Assessment and Plan








Lower extremity edema


- Suspect possible acute systolic heart failure, obtaining echocardiogram


- We will diurese with Lasix twice daily, check BMP in a.m.


- Intake and output 





A. fib


- given iv Cardizem in ED, Continue home Xarelto and Cardizem, will add on 

Lopressor





continue home meds otherwise. 





xarelto





Physician Certification


2 Midnight Certification Type:  Admission for Inpatient Services


Order for Inpatient Services


The services are ordered in accordance with Medicare regulations or non-

Medicare payer requirements, as applicable.  In the case of services not 

specified as inpatient-only, they are appropriately provided as inpatient 

services in accordance with the 2-midnight benchmark.


Estimated LOS (days):  2


2 days is the estimated time the patient will need to remain in the hospital, 

assuming treatment plan goals are met and no additional complications.


Post-Hospital Plan:  Not yet determined











Zechariah Gore MD 2018 15:08

## 2018-03-01 VITALS
SYSTOLIC BLOOD PRESSURE: 125 MMHG | TEMPERATURE: 95.4 F | DIASTOLIC BLOOD PRESSURE: 69 MMHG | HEART RATE: 108 BPM | RESPIRATION RATE: 18 BRPM | OXYGEN SATURATION: 100 %

## 2018-03-01 VITALS
TEMPERATURE: 97 F | RESPIRATION RATE: 20 BRPM | OXYGEN SATURATION: 100 % | DIASTOLIC BLOOD PRESSURE: 79 MMHG | HEART RATE: 106 BPM | SYSTOLIC BLOOD PRESSURE: 120 MMHG

## 2018-03-01 VITALS
RESPIRATION RATE: 20 BRPM | OXYGEN SATURATION: 97 % | TEMPERATURE: 96.4 F | HEART RATE: 97 BPM | DIASTOLIC BLOOD PRESSURE: 70 MMHG | SYSTOLIC BLOOD PRESSURE: 134 MMHG

## 2018-03-01 LAB
BUN SERPL-MCNC: 12 MG/DL (ref 7–18)
CALCIUM SERPL-MCNC: 8.4 MG/DL (ref 8.5–10.1)
CHLORIDE SERPL-SCNC: 101 MEQ/L (ref 98–107)
CREAT SERPL-MCNC: 0.7 MG/DL (ref 0.5–1)
GFR SERPLBLD BASED ON 1.73 SQ M-ARVRAT: 84 ML/MIN (ref 89–?)
GLUCOSE SERPL-MCNC: 83 MG/DL (ref 74–106)
HCO3 BLD-SCNC: 34.8 MEQ/L (ref 21–32)
SODIUM SERPL-SCNC: 141 MEQ/L (ref 136–145)

## 2018-03-01 RX ADMIN — METOPROLOL TARTRATE SCH MG: 25 TABLET, FILM COATED ORAL at 06:00

## 2018-03-01 RX ADMIN — FUROSEMIDE SCH MG: 10 INJECTION, SOLUTION INTRAMUSCULAR; INTRAVENOUS at 10:22

## 2018-03-01 NOTE — ECHRPT
Indication:   Heart failure, unspecified

 

 CONCLUSIONS

 The left ventricular systolic function is severely reduced with an estimated ejection fraction in th
e range of 

 30-35%. 

 Wall thickness is measured at the upper limits of normal. 

 Normal left ventricular size. 

 The left atrial size is moderately to severely dilated. 

 Mild-to-moderate mitral valve regurgitation. 

 There is moderate tricuspid regurgitation. 

 The estimated pulmonary arterial pressure is 39.4 mmHg.

 aortic valve sclerosis

 

 BP:        /         HR:                          Rhythm:           Other

 

 MEASUREMENTS  (Male / Female) Normal Values       Technical Quality:Good

 2D ECHO

 LV Diastolic Diameter PLAX        5.5 cm                4.2 - 5.9 / 3.9 - 5.3 cm

 LV Systolic Diameter PLAX         4.8 cm                

 IVS Diastolic Thickness           1.1 cm                0.6 - 1.0 / 0.6 - 0.9 cm

 LVPW Diastolic Thickness          1.1 cm                0.6 - 1.0 / 0.6 - 0.9 cm

 LV Relative Wall Thickness        0.4                   

 LVOT Diameter                     2.1 cm                

 

 M-MODE

 Aortic Root Diameter MM           2.8 cm                

 LA Systolic Diameter MM           6.0 cm                

 LA Ao Ratio MM                    2.1                   

 AV Cusp Separation MM             1.5 cm                

 

 DOPPLER

 AV Peak Velocity                  187.0 cm/s            

 AV Peak Gradient                  14.0 mmHg             

 AI Peak Velocity                  376.0 cm/s            

 AI Peak Gradient                  56.6 mmHg             

 AI Pressure Half Time             1225.0 ms             

 LVOT Peak Velocity                62.2 cm/s             

 LVOT Peak Gradient                1.5 mmHg              

 AV Area Cont Eq pk                1.2 cm               

 MR Peak Velocity                  366.0 cm/s            

 MR Peak Gradient                  53.6 mmHg             

 TR Peak Velocity                  271.0 cm/s            

 TR Peak Gradient                  29.4 mmHg             

 Right Atrial Pressure             10.0 mmHg             

 Pulmonary Artery Systolic Pressu  39.4 mmHg             

 Right Ventricular Systolic Press  39.4 mmHg             

 PV Peak Velocity                  90.9 cm/s             

 PV Peak Gradient                  3.3 mmHg              

 

 

 FINDINGS

 

 LEFT VENTRICLE

 The left ventricular systolic function is severely reduced with an estimated ejection fraction in th
e range of 

 30-35%. 

 Wall thickness is measured at the upper limits of normal. 

 Normal left ventricular size. 

 

 RIGHT VENTRICLE

 Normal right ventricular size and systolic function.  

 

 LEFT ATRIUM

 The left atrial size is moderately to severely dilated. 

 

 RIGHT ATRIUM

 The right atrial size is normal.  

 

 ATRIAL SEPTUM

 Normal atrial septal thickness without atrial level shunting by limited color doppler interrogation.
  

 

 AORTA

 The aortic root and proximal ascending aorta are normal in size on limited imaging.  

 

 MITRAL VALVE

 Mild-to-moderate mitral valve regurgitation. 

 

 AORTIC VALVE

 Trileaflet aortic valve. No aortic valve stenosis or regurgitation.  

 

 TRICUSPID VALVE

 There is moderate tricuspid regurgitation. 

 The estimated pulmonary arterial pressure is 39.4 mmHg. 

 

 PULMONARY VALVE

 No pulmonary valve regurgitation or stenosis.  

 

 VESSELS

 The inferior vena cava is normal in size.  

 

 PERICARDIUM

 No pericardial effusion.  

 

 

 

 

  Venancio Brown MD, FACC, [facility] [place]

  (Electronically Signed)

  Final Date:01 March 2018 11:49

## 2018-03-01 NOTE — EKG
Date Performed: 02/28/2018       Time Performed: 11:12:25

 

PTAGE:      68 years

 

EKG:      ATRIAL FIBRILLATION WITH RAPID VENTRICULAR RESPONSE BORDERLINE LEFT AXIS DEVIATION NONSPECI
FIC T-WAVE ABNORMALITY ABNORMAL RHYTHM ECG Since the prior tracing, there has been no significant leda
nge 

 

 PREVIOUS TRACING            : 02/25/2018 16.41

 

DOCTOR:   Meghan Gallagher  Interpretating Date/Time  03/01/2018 11:11:26

## 2018-03-01 NOTE — HHI.DCPOC
Discharge Care Plan


Diagnosis:  


(1) Atrial fibrillation with rapid ventricular response


(2) CHF (congestive heart failure)


Goals to Promote Your Health


* To prevent worsening of your condition and complications


* To maintain your health at the optimal level


Directions to Meet Your Goals


*** Take your medications as prescribed


*** Follow your dietary instruction


*** Follow activity as directed








*** Keep your appointments as scheduled


*** Take your immunizations and boosters as scheduled


*** If your symptoms worsen call your PCP, if no PCP go to Urgent Care Center 

or Emergency Room***


*** Smoking is Dangerous to Your Health. Avoid second hand smoke***


***Call the 24-hour hour crisis hotline for domestic abuse at 1-293.279.9669***











Zechariah Gore MD Mar 1, 2018 11:46

## 2018-03-01 NOTE — HHI.PR
Subjective


Remarks


Nursing denies any deterioration since last night.  Patient thinks that her 

edema is even better today than yesterday.  Says she is able to walk around 

quite a lot in the hallways, denies having any chest pain on exertion or rest.  

Denies any shortness of breath.





Objective





Vital Signs








  Date Time  Temp Pulse Resp B/P (MAP) Pulse Ox O2 Delivery O2 Flow Rate FiO2


 


3/1/18 08:00 96.4 97 20 134/70 (91) 97   


 


3/1/18 04:00 97.0 106 20 120/79 (93) 100   


 


3/1/18 00:00 95.4 108 18 125/69 (87) 100   


 


2/28/18 20:00 96.8 86 16 116/89 (98) 100   


 


2/28/18 14:22 96.6 119 16 121/84 (96) 99   


 


2/28/18 14:10  92 18 138/89 (105)    


 


2/28/18 12:56  98 18 151/99 (116) 98 Room Air  


 


2/28/18 12:40  89 18 111/71 (84) 98 Room Air  


 


2/28/18 11:54  81      


 


2/28/18 11:53  100 18 126/97 (107) 97 Room Air  





    138/80 (99)    














I/O      


 


 2/28/18 2/28/18 2/28/18 3/1/18 3/1/18 3/1/18





 07:00 15:00 23:00 07:00 15:00 23:00


 


Intake Total  250 ml 1790 ml 480 ml  


 


Balance  250 ml 1790 ml 480 ml  


 


      


 


Intake Oral  250 ml 1790 ml 480 ml  


 


# Voids   7 4  


 


# Bowel Movements   1 0  








Result Diagram:  


2/28/18 1125                                                                   

             3/1/18 0535





Objective Remarks


Clear lungs bilaterally, heart rate is regular with irregular rhythm, 


very mild lower extremity edema bilaterally- improved since yesterday





A/P


Assessment and Plan


Lower extremity edema is much improved.  Echocardiogram was performed.  We will 

discharge the patient with as needed low dose Lasix up to 40 mg a day for lower 

extremity edema.  In regards to the patient's atrial fibrillation, we will 

continue her home Cardizem but also transition her from the inpatient Lopressor 

to 25 mg of Lopressor twice daily.





Patient has met maximum benefit from hospitalization is clinically stable for 

discharge.











Zechariah Gore MD Mar 1, 2018 11:51